# Patient Record
Sex: FEMALE | URBAN - METROPOLITAN AREA
[De-identification: names, ages, dates, MRNs, and addresses within clinical notes are randomized per-mention and may not be internally consistent; named-entity substitution may affect disease eponyms.]

---

## 2019-07-24 ENCOUNTER — APPOINTMENT (OUTPATIENT)
Age: 29
Setting detail: DERMATOLOGY
End: 2019-07-30

## 2019-07-24 VITALS — RESPIRATION RATE: 18 BRPM | WEIGHT: 135 LBS | HEIGHT: 65 IN

## 2019-07-24 DIAGNOSIS — D22 MELANOCYTIC NEVI: ICD-10-CM

## 2019-07-24 PROBLEM — D23.72 OTHER BENIGN NEOPLASM OF SKIN OF LEFT LOWER LIMB, INCLUDING HIP: Status: ACTIVE | Noted: 2019-07-24

## 2019-07-24 PROBLEM — D22.71 MELANOCYTIC NEVI OF RIGHT LOWER LIMB, INCLUDING HIP: Status: ACTIVE | Noted: 2019-07-24

## 2019-07-24 PROBLEM — D22.5 MELANOCYTIC NEVI OF TRUNK: Status: ACTIVE | Noted: 2019-07-24

## 2019-07-24 PROCEDURE — OTHER COUNSELING: OTHER

## 2019-07-24 PROCEDURE — OTHER ADDITIONAL NOTES: OTHER

## 2019-07-24 PROCEDURE — OTHER MIPS QUALITY: OTHER

## 2019-07-24 PROCEDURE — OTHER PHOTO-DOCUMENTATION: OTHER

## 2019-07-24 PROCEDURE — 99202 OFFICE O/P NEW SF 15 MIN: CPT

## 2019-07-24 PROCEDURE — OTHER EDUCATIONAL RESOURCES PROVIDED: OTHER

## 2019-07-24 ASSESSMENT — LOCATION ZONE DERM
LOCATION ZONE: TRUNK
LOCATION ZONE: LEG

## 2019-07-24 ASSESSMENT — LOCATION DETAILED DESCRIPTION DERM
LOCATION DETAILED: RIGHT ANTERIOR PROXIMAL THIGH
LOCATION DETAILED: RIGHT RIB CAGE

## 2019-07-24 ASSESSMENT — LOCATION SIMPLE DESCRIPTION DERM
LOCATION SIMPLE: RIGHT THIGH
LOCATION SIMPLE: ABDOMEN

## 2019-07-24 NOTE — PROCEDURE: ADDITIONAL NOTES
Additional Notes: Full skin exam offered today due to family history of melanoma, patient wishes to wait on scheduling for now.
Detail Level: Zone